# Patient Record
Sex: FEMALE | Race: WHITE | HISPANIC OR LATINO | Employment: UNEMPLOYED | URBAN - METROPOLITAN AREA
[De-identification: names, ages, dates, MRNs, and addresses within clinical notes are randomized per-mention and may not be internally consistent; named-entity substitution may affect disease eponyms.]

---

## 2023-06-09 ENCOUNTER — APPOINTMENT (OUTPATIENT)
Dept: RADIOLOGY | Facility: MEDICAL CENTER | Age: 38
End: 2023-06-09
Attending: EMERGENCY MEDICINE
Payer: MEDICAID

## 2023-06-09 ENCOUNTER — HOSPITAL ENCOUNTER (EMERGENCY)
Facility: MEDICAL CENTER | Age: 38
End: 2023-06-10
Attending: EMERGENCY MEDICINE
Payer: MEDICAID

## 2023-06-09 DIAGNOSIS — S60.222A CONTUSION OF LEFT HAND, INITIAL ENCOUNTER: ICD-10-CM

## 2023-06-09 DIAGNOSIS — R20.0 NUMBNESS AND TINGLING IN LEFT HAND: ICD-10-CM

## 2023-06-09 DIAGNOSIS — R20.2 NUMBNESS AND TINGLING IN LEFT HAND: ICD-10-CM

## 2023-06-09 DIAGNOSIS — M79.642 LEFT HAND PAIN: ICD-10-CM

## 2023-06-09 PROCEDURE — 73130 X-RAY EXAM OF HAND: CPT | Mod: RT

## 2023-06-09 PROCEDURE — 99283 EMERGENCY DEPT VISIT LOW MDM: CPT

## 2023-06-10 VITALS
DIASTOLIC BLOOD PRESSURE: 72 MMHG | OXYGEN SATURATION: 98 % | HEIGHT: 67 IN | SYSTOLIC BLOOD PRESSURE: 128 MMHG | HEART RATE: 90 BPM | RESPIRATION RATE: 16 BRPM | WEIGHT: 233.69 LBS | BODY MASS INDEX: 36.68 KG/M2 | TEMPERATURE: 98 F

## 2023-06-10 NOTE — CONSULTS
"Orthopedic Consult Note    6/9/2023    Time Called: 9:59 PM  Time Arrived: 10:25 PM  Reason for consult: Rule out compartment syndrome right hand      HPI: Monse Sparks is a 38 y.o. female who presents with complaints of pain, tingling and color changes to the right hand fingers.  She states 3 days ago she slammed her hand in the fridge.  Since then she has been experiencing swelling, discoloration of her fingers turning pale to blue to back to normal.  She has tingling as well to her digits that tracks up her arms up to her elbows.    She has a history of more than a pack a day smoking. she denies any history of this happening to her hands before.  History reviewed. No pertinent past medical history.    History reviewed. No pertinent surgical history.    Medications  No current facility-administered medications on file prior to encounter.     No current outpatient medications on file prior to encounter.       Allergies  Patient has no known allergies.    ROS  12 point review of systems reviewed the patient negative listed in the HPI. All other systems were reviewed and found to be negative    History reviewed. No pertinent family history.    Social History     Socioeconomic History    Marital status: Single   Tobacco Use    Smoking status: Every Day     Packs/day: 0.50     Types: Cigarettes    Smokeless tobacco: Never   Substance and Sexual Activity    Alcohol use: Not Currently     Comment: 3-4 months sober    Drug use: Not Currently       Physical Exam  Vitals  BP (!) 140/87   Pulse 95   Temp 36.4 °C (97.6 °F) (Temporal)   Resp 18   Ht 1.702 m (5' 7\")   Wt 106 kg (233 lb 11 oz)   SpO2 99%   General: Well Developed, Well Nourished, Age appropriate appearance  HEENT: Normocephalic, atraumatic  Psych: Normal mood and affect  Neck: Supple, nontender, no masses  Lungs: Breathing unlabored, No audible wheezing  Heart: Regular heart rate and rhythm  Abdomen: Soft, NT, ND  Neuro: Sensation grossly intact to BUE " and BLE, moving all four extremities  Vascular: She has delayed capillary refill to the digits of the right hand all digits are involved.  There is blood flow however.  The fingers intermittently become slightly bluish in coloration and then regained color.    MSK: Right upper extremity: Compartments are soft and compressible and not swollen to the right hand.  There is no concern for compartment syndrome to the right hand.  There is no deformity of the right hand.  There is minimal soft tissue swelling diffusely to the right hand.  Sensation is present to light touch to all fingers.  Sensation is present to light touch in median radian and ulnar nerve distributions.  Subjective diminished sensation to the right hand compared to the left.  She has 2+ radial pulse.  Capillary refill is greater than 3 seconds but is present to all fingers.      Radiographs:  DX-HAND 3+ RIGHT   Final Result         No acute osseous abnormality.          Laboratory Values      No results for input(s): SODIUM, POTASSIUM, CHLORIDE, CO2, GLUCOSE, BUN, CPKTOTAL in the last 72 hours.          Impression: 38-year-old female 3 days status post getting her right hand slammed into a refrigerator door    Consulted to rule out compartment syndrome.    She has no fractures.  There is no concern for compartment syndrome.  Her exam is concerning for a vasculitis affecting all digits of her right hand possibly triggered by trauma to the right hand.        Plan:  No acute orthopedic intervention indicated  No concern for compartment syndrome.  No fractures to the hand  Exam is most concerning for a vasculitis affecting the vessels in her right hand.  I recommend a vascular consult  Recommend smoking cessation    This patient was seen at the request of the emergency room provider for an urgent consult to rule out compartment syndrome and thus could not be done on an outpatient basis.      Isai Haney MD  Orthopedic Trauma Fellow

## 2023-06-10 NOTE — ED TRIAGE NOTES
Chief Complaint   Patient presents with    Hand Injury     R hand. Pt got her hand pinned behind a freezer while moving 3 days ago and pt reports numbness in her fingers, pain in hand and her fingers are turning purple.     Sent by MD     Pt sent from [facility] for concern of compartment syndrome.      Pt ambulatory to triage for above complaint. Pt reports intermittent discoloration to her fingertips where they turn purple and then get some color back. Pt has pain that radiates all the way up her arm and into her chest area at times. Pt can still move fingers, but denies sensation when touched.

## 2023-09-18 ENCOUNTER — DOCUMENTATION (OUTPATIENT)
Dept: VASCULAR LAB | Facility: MEDICAL CENTER | Age: 38
End: 2023-09-18
Payer: MEDICAID

## 2023-09-18 NOTE — PROGRESS NOTES
Left message for patient to call back and schedule new patient appt with Dr. Amador. Next available ok. Direct clinic line given on .       Marlen Fierro, Medical Assistant   [facility]   Ph: [phone]  Fx: [phone]

## 2023-10-03 ENCOUNTER — DOCUMENTATION (OUTPATIENT)
Dept: VASCULAR LAB | Facility: MEDICAL CENTER | Age: 38
End: 2023-10-03
Payer: MEDICAID

## 2023-10-03 ENCOUNTER — TELEPHONE (OUTPATIENT)
Dept: VASCULAR LAB | Facility: MEDICAL CENTER | Age: 38
End: 2023-10-03
Payer: MEDICAID

## 2023-10-03 NOTE — TELEPHONE ENCOUNTER
Failed contact with patient after few attempts for initial with Dr. Amador    Dates provided below / staff message sent to Dr. Bloch :    10/03-  VM  09/25- pt to call back / out of town / will f/u in a week   09/18-vm

## 2023-10-04 NOTE — PROGRESS NOTES
Patient referred to vascular care  Unfortunately our schedulers have been unable to reach patient despite multiple attempts  Unless patient establishes with a face-to-face visit in our office will be unable to take part in care  Await further patient contact.  Pending further contact, we will defer all further management of vascular disease and its risk factors to PCP and/or referring MD.    Michael J. Bloch, MD  Vascular Care

## 2023-12-18 ENCOUNTER — OFFICE VISIT (OUTPATIENT)
Dept: NEUROLOGY | Facility: MEDICAL CENTER | Age: 38
End: 2023-12-18
Attending: PSYCHIATRY & NEUROLOGY
Payer: MEDICAID

## 2023-12-18 VITALS
HEIGHT: 67 IN | OXYGEN SATURATION: 97 % | DIASTOLIC BLOOD PRESSURE: 62 MMHG | TEMPERATURE: 96.6 F | HEART RATE: 78 BPM | WEIGHT: 275.8 LBS | BODY MASS INDEX: 43.29 KG/M2 | SYSTOLIC BLOOD PRESSURE: 112 MMHG

## 2023-12-18 DIAGNOSIS — E66.01 SEVERE OBESITY (BMI >= 40) (HCC): ICD-10-CM

## 2023-12-18 DIAGNOSIS — F10.21 ALCOHOL DEPENDENCE IN REMISSION (HCC): ICD-10-CM

## 2023-12-18 DIAGNOSIS — D68.51 FACTOR 5 LEIDEN MUTATION, HETEROZYGOUS (HCC): ICD-10-CM

## 2023-12-18 DIAGNOSIS — R20.0 NUMBNESS OF FINGERS: ICD-10-CM

## 2023-12-18 DIAGNOSIS — F17.201 TOBACCO DEPENDENCE IN REMISSION: ICD-10-CM

## 2023-12-18 PROBLEM — D64.9 ACUTE ANEMIA: Status: ACTIVE | Noted: 2023-06-15

## 2023-12-18 PROCEDURE — 3074F SYST BP LT 130 MM HG: CPT | Performed by: PSYCHIATRY & NEUROLOGY

## 2023-12-18 PROCEDURE — 99211 OFF/OP EST MAY X REQ PHY/QHP: CPT | Performed by: PSYCHIATRY & NEUROLOGY

## 2023-12-18 PROCEDURE — 99204 OFFICE O/P NEW MOD 45 MIN: CPT | Performed by: PSYCHIATRY & NEUROLOGY

## 2023-12-18 PROCEDURE — 3078F DIAST BP <80 MM HG: CPT | Performed by: PSYCHIATRY & NEUROLOGY

## 2023-12-18 RX ORDER — OMEPRAZOLE 40 MG/1
40 CAPSULE, DELAYED RELEASE ORAL DAILY
COMMUNITY
Start: 2023-10-05

## 2023-12-18 RX ORDER — HYDROXYZINE PAMOATE 50 MG/1
50 CAPSULE ORAL 3 TIMES DAILY PRN
COMMUNITY
End: 2024-01-15

## 2023-12-18 RX ORDER — SILDENAFIL 25 MG/1
25 TABLET, FILM COATED ORAL DAILY
COMMUNITY
End: 2024-01-15

## 2023-12-18 ASSESSMENT — PATIENT HEALTH QUESTIONNAIRE - PHQ9: CLINICAL INTERPRETATION OF PHQ2 SCORE: 0

## 2023-12-18 NOTE — PROGRESS NOTES
Reason for Consult:  sensory disturbances of digits of right hand after trauma of right distal fingers    History of present illness:    Monse Sparks 38 y.o.right handed woman who is here with her father. [place] and graduated High School there. No additional education since that time.    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    Problem List reviewed.  Factor 5 Leiden positivity tested after her above accident>   SINGLE R506Q mutation identified (HETEROZYGOTE).    She had an accident> she was helping someone move a refrigerator at her friends house and smashed her right hand (specifically in the area of the right index,middle,ring and 5th fingers)> about 2 days after this above event  happened the digits (as above) all turned black within a period of 24 hours   > she showed me a picture of the fingers.    She started to take nitroglycerin ointment (placed on her fingers) and then started to take Viagra (25 mg a day).     The pictures she showed me had eschar type to the point that the skin peeled off  of the distal portions of the areas of the 5th,4th and 2nd (index) fingers from the approximate DIP joints to the tips of fingers and the nails were yellow (note: the middle finger (ie, 3rd finger)> was very limited in terms of any color change or sensory disturbances (this finger feels normal now).    She patricia a picture on the palmar portions of the right fingers where there is reduced sensation distally to the boundaries of her pen drawings.    The areas of loss of sensation remains in the right distal 5th finger between the DIP and PIP joints (only on the palm side), just below the crease of the right 4th finger's PIP joint and just proximal to the DIP joint of the right index finger.    The above locations and degree of sensory loss has remained to same to Mark over the last 3 plus months without any noticeable changes in the distribution or characteristics  (severity) of the sensory abnormalities.    Note: Entire right thumb and entire palmar surface and entire right dorsal parts of the hand and fingers and thumb feel normal to her.    She has not been limited by or doing texting,eating,holding basic objects of over 10 lbs in her right hand in the recent few months.    She has not been consistently dropping objects (light up to 10 lbs or so) from her right hand in the last several months.    She has not noticed any neuropathic pains (sharp,aching,stabbing,burning discomforts) in or involving the right hand and fingers proper in the last few months.    She has no history of prior sensory disturbances of the digits or distal limbs proper in the months to years preceding August 2023.    Of note: she had Ultrasound assessment of her right wrist crease area where they discovered a blood clot around the wrist. Because of her history of bleeding ulcer(s) she was not given any anticoagulants.    There is no Neuropathic pain(s) ongoing of the right hand or fingers proper in the recent months.      Quit smoking about 6 months ago>>  1.5 to 2 packs day since High School. She quit at the time of her injury.  Drinking alcohol - 1 pint daily (Vodka) and occasionally Whiskey since High School years and until about 6 months.  No IVDA or other illicit drugs use.      Family Hx:    Factor 5 Leiden > Mother, Mark and Daughter.      Siblings: alive and well      There are no problems to display for this patient.      Past medical history:   No past medical history on file.    Past surgical history:   No past surgical history on file.      Social history:   Social History     Socioeconomic History    Marital status: Single     Spouse name: Not on file    Number of children: Not on file    Years of education: Not on file    Highest education level: Not on file   Occupational History    Not on file   Tobacco Use    Smoking status: Former     Current packs/day: 0.50     Types: Cigarettes     "Smokeless tobacco: Never   Vaping Use    Vaping Use: Never used   Substance and Sexual Activity    Alcohol use: Not Currently     Comment: 3-4 months sober    Drug use: Not Currently    Sexual activity: Not on file   Other Topics Concern    Not on file   Social History Narrative    Not on file     Social Determinants of Health     Financial Resource Strain: Not on file   Food Insecurity: Not on file   Transportation Needs: Not on file   Physical Activity: Not on file   Stress: Not on file   Social Connections: Not on file   Intimate Partner Violence: Not on file   Housing Stability: Not on file       Family history:   No family history on file.      Current medications:   Current Outpatient Medications   Medication    omeprazole (PRILOSEC) 40 MG delayed-release capsule    sildenafil citrate (VIAGRA) 25 MG Tab     No current facility-administered medications for this visit.       Medication Allergy:  No Known Allergies        Physical examination:   Vitals:    12/18/23 0829   BP: 112/62   BP Location: Right arm   Patient Position: Sitting   BP Cuff Size: Large adult   Pulse: 78   Temp: 35.9 °C (96.6 °F)   TempSrc: Temporal   SpO2: 97%   Weight: (!) 125 kg (275 lb 12.7 oz)   Height: 1.702 m (5' 7\")       Normal Cephalic Atraumatic.  General: Full Range of Movement around the Neck in all directions without restrictions or discrete pain evoked triggers.  No lower extremity edema.      Neurological  Exam:    Mental status: Awake, alert and fully oriented to person, place, time, and situation. Normal attention and concentration.  Did not appear/act combative,irritable,anxious,paranoid/delusional or aggressive to or with me.  Speech and language: Speech is fluent without errors, clear, intact to repetition, and intact to naming.     Follows 3 step motor commands in sequence without significant delay and correctly.    Cranial nerve exam:  II: Pupils are equally round and reactive to light. Visual fields are intact by " confrontation.  III, IV, VI: EOMI, no diplopia, no ptosis.  V: Sensation to light touch is normal over V1-3 distributions bilaterally.  .  VII: Facial movements are symmetrical. There is no facial droop. .  VIII: Hearing intact to soft speech and finger rub bilaterally  IX: Palate elevates symmetrically, uvula is midline. Dysarthria is not present.  XI: Shoulder shrug are symmetrical and strong.   XII: Tongue protrudes midline.        Motor exam:  Muscle tone is normal in all 4 limbs. and No abnormal movements appreciated.    There is no atrophy of the right hand muscles seen.    There is no atrophy of the right intrinsic hands muscles.      Muscle strength:    Neck Flexors/Extensors: 5/5       Right  Left  Deltoid   5/5  5/5      Biceps   5/5  5/5  Triceps              5/5  5/5   Wrist extensors 5/5  5/5  Wrist flexors  5/5  5/5     5/5  5/5  Interossei  5/5  5/5  Thenar (APB)  5/5  5/5   Hip flexors  5/5  5/5  Quadriceps  5/5  5/5    Hamstrings  5/5  5/5  Dorsiflexors  5/5  5/5  Plantarflexors  5/5  5/5  Toe extension  5/5  5/5  Toe Flexors                5/5                   5/5    Sensory exam:    There is a negative Tinel's response at the right ulnar wrist crease.    Vibratory Threshold:    Right 5th finger:   7-8  seconds  at distal joint  Right 4th finger:    7-9  seconds at distal joint  Right 3rd finger:   14-16 seconds at distal joint  Right 2nd finger:   20 seconds at distal joint  Right Thumb:   18-20 seconds at distal joint    Proprioception: normal at all DIP Joints Right Thumb through 5th fingers.    NO allodynia,hyperpathia, hypalgia  of any of the right hand's fingers or thumb.      Left hand: Thumb and 5th fingers>  18-20 seconds at DIP joints.  Proprioception: normal at all DIP joints.    Reduced but present pin prick- 4th,5th and part of very distal tip of right index fingers.      Reflexes:       Right  Left  Biceps   2/4  2/4  Triceps              2/4  2/4  Brachioradialis 2/4  2/4  Knee  jerk  2/4  2/4  Ankle jerk  2/4  2/4     Frontal release signs are normal.    bilaterally toes are downgoing to plantar stimulation..    Coordination (finger-to-nose, heel/knee/shin, rapid alternating movements) was normal.     There was no truncal ataxia, no tremors, and no dysmetria.     Station and gait - easily stands up from exam chair without retropulsion,veering,leaning,swaying (to either side).   Arm swing symmetrical.    No Rombergism.      Labs and Tests:    June 14 2023:    PT,INR,PTT- wnl.  dRVVT- 34.7  Anticardiolipin ab <9 (wnl).  Anticardiolipin ab IGG,IGM- wnl    Beta 2 Glycoprotein ab - IGG- <9- wnl  Beta 2 Glycoprotein ab IGM- <9 - wnl    Antiphospholipid syndrome assessment> a lupus anticoagulant is not detected (6/14/2023)    Protein S  (Total,free,functional)- all normal    BRIDGER- DNA (DS)- 2  RNP antibodies- <0.2   Guzmán antibodies < 0.2  Antiscleroderma-j70 Antibodies< 0.2   Sjogrens anti SSA-SSB- < 0.2   Antichromatic antibodies  <0.2   Anit Shreya 1<  0.2  Anit Centrometere B Antibodies < 0.2      Protein C Antigen  87  Protein C Functional 115     NEUROIMAGING:     [facility] ([place], [place]) blood work reviewed from August 2023 (per referral information)      Impression/Plans/Recommendations:    Nerve Injury- compression (external) with focal blood clot in August 2023 > seen at [facility].    Residual mild and static painless numbness remains of the right 4th,5th and parts of the right index fingers (sparing middle finger and thumb).    There is no present evidence for Causalgia/RSD.      Gross strength of individual muscles of right hand including right ADM,Adductor pollicus, FDI, finger flexors (at DIP and PIP joints) are good and symmetrical compared to left hand.    There is no evidence for a widespread peripheral neuropathy condition at this time.    We had a discussion about the pros and cons of electrodiagnostic testing (such as Nerve Conduction with EMG  testing) and given the overall situation and mild degree of sensory impairments such testing would be to me more objective and not change future management nor predict future potential for nerve regeneration that is abt to occur in the next year.      Her hand deficits to me are leading to significant functional limitations at this time and thus a more complex nerve reinnvervation procedure would likely lead to more risks than the minor deficits she presently has at the moment and in the last few months.  She was in agreement to not pursue additional electrodiagnostic testing after our discussion today.      2. Obesity- encouraged goal BMI under 30.0 in the next year to reduce the associated metabolic complications of developing diabetes.    3. Factor 5 Leiden- Heterozygote- discovered at time of Right hand compression accident in August 2023.    4. Tobacco and Alcohol Dependency- in sustained remission since August 2023.    I have performed  a history and physical exam and a directed /focused  ROS today.    Total time spent today or this patient's care was 54  minutes included reviewing diagnostic workup to date (labs and imaging that include interpreting such tests relevant to this patient's neurological condition),  reviewing/obtaining separately obtained history (from patient and/or accompanying family father)  for today's neurological problem(s) , ordering either/or medications and additional tests, giving advise and suggestions on the present neurological problem and  documenting the clinical information in the EMR.    Follow up at this time KATHRINE Lyons MD  [place] [facility].   [facility]

## 2024-01-15 ENCOUNTER — TELEMEDICINE (OUTPATIENT)
Dept: VASCULAR LAB | Facility: MEDICAL CENTER | Age: 39
End: 2024-01-15
Attending: FAMILY MEDICINE
Payer: MEDICAID

## 2024-01-15 ENCOUNTER — APPOINTMENT (OUTPATIENT)
Dept: HEMATOLOGY ONCOLOGY | Facility: MEDICAL CENTER | Age: 39
End: 2024-01-15
Payer: MEDICAID

## 2024-01-15 DIAGNOSIS — D68.51 FACTOR 5 LEIDEN MUTATION, HETEROZYGOUS (HCC): ICD-10-CM

## 2024-01-15 DIAGNOSIS — E66.01 SEVERE OBESITY (BMI >= 40) (HCC): ICD-10-CM

## 2024-01-15 DIAGNOSIS — R20.0 NUMBNESS OF FINGERS: ICD-10-CM

## 2024-01-15 DIAGNOSIS — Z87.891 FORMER SMOKER: ICD-10-CM

## 2024-01-15 DIAGNOSIS — F10.21 ALCOHOL USE DISORDER, MODERATE, IN SUSTAINED REMISSION (HCC): ICD-10-CM

## 2024-01-15 DIAGNOSIS — Z87.11 HISTORY OF PEPTIC ULCER DISEASE: ICD-10-CM

## 2024-01-15 DIAGNOSIS — I73.00 SECONDARY RAYNAUD'S PHENOMENON: ICD-10-CM

## 2024-01-15 PROCEDURE — 99204 OFFICE O/P NEW MOD 45 MIN: CPT | Performed by: FAMILY MEDICINE

## 2024-01-15 RX ORDER — SILDENAFIL 25 MG/1
25 TABLET, FILM COATED ORAL
Qty: 60 TABLET | Refills: 5 | Status: SHIPPED | OUTPATIENT
Start: 2024-01-15

## 2024-01-15 RX ORDER — ERGOCALCIFEROL 1.25 MG/1
CAPSULE ORAL
COMMUNITY
Start: 2024-01-11

## 2024-01-15 RX ORDER — FLUOXETINE HCL 20 MG
CAPSULE ORAL
COMMUNITY
Start: 2024-01-11

## 2024-01-15 ASSESSMENT — ENCOUNTER SYMPTOMS
WHEEZING: 0
COUGH: 0
SHORTNESS OF BREATH: 0
FEVER: 0
PND: 0
PALPITATIONS: 0
SPUTUM PRODUCTION: 0
CHILLS: 0
ORTHOPNEA: 0
HEMOPTYSIS: 0
CLAUDICATION: 0

## 2024-01-15 NOTE — PROGRESS NOTES
This visit was conducted via Zoom video call using secure and encrypted video conferencing technology to reduce spread of and/or potential exposures to COVID.  The patient was in a private location (home) in the state of [place].    The patient's identity was confirmed and verbal consent was obtained for this virtual visit.     INITIAL VASCULAR ANTICOAGULATION VISIT  01/15/24     Monse Sparks is a 38 y.o. female who presents for initial  Initially referred by ROSITA Ku ([place], [place]) for review of FVL+ and VTE risk eval and raynaud's, est 1/2024    Subjective      Raynaud's phenomenon, secondary   INITIAL VISIT HISTORY: had RUE hand/wrist crush injury 6/2023, seen at [facility] based clinic and had RUE art duplex with ulnar art stenosis, had thrombophilia w/u, started on sildenafil.  Still having RUE index to little finger pallor with cyanosis with exercise, increase HR, cold temp but limited pain, no ulcerations/wounds on finger tips.  Last 30min and resolves with rewarming and reduced HR.    No other known vascular disease.   Notes from [facility] reviewed.   Hx of Tobacco? Yes, Details: quit   Any occupations/environmental factors? No  Any vasoactive drug exposure?  No  Hx of neuropathy? No  Hx of PAD or VTE/CVI? Yes, Details: RUE ulnar stenosis 6/2023   Hx of Endocrine disease? No  Hx of Rheum/auto-immune disease? No  Hx of cancer? No  Famhx of Raynaud's or rheum disorders (see famhx)? No  Abnormalities on prior labs? No except FVL+     Heterozygous FVL+  INITIAL VISIT HISTORY: missed initial appt 10/2023, had prior thrombophilia eval.  Had hand crush injury and darkening of hand.  No issues.    Reports with exercise noting progressive darkening of the index to little to PIP joints.  Recovers at 30min and improves with heart.  Had cold fingers and intermittent pain.    Hx of bleeding PUD - pending GI (last July 2023) - prior heavy etoh.    PROVOKING FACTORS:  Personal  VTE hx? 6/2023 - had crush injury, seen at [facility], [facility] and had RUE duplex showing  Family VTE hx or clotting d/o? Yes, Details: FVL+  Other risk factors:  obesity   WOMEN:        Any estrogen, testosterone HRT, E2 birth control, tamoxifene, raloxifene: no       Hx of recurrent miscarriages: no  Hypercoagulability work-up completed?  no    Patient Active Problem List   Diagnosis    Tobacco dependence in remission    Numbness of fingers    Factor 5 Leiden mutation, heterozygous (HCC)    Severe obesity (BMI >= 40) (HCC)    Alcohol dependence in remission (HCC)    Acute anemia    Secondary Raynaud's phenomenon    Former smoker    History of peptic ulcer disease      History reviewed. No pertinent surgical history.    Current Outpatient Medications:     PROzac,     vitamin D2 (Ergocalciferol),     sildenafil citrate, 25 mg, Oral, BID    omeprazole, 40 mg, Oral, DAILY     No Known Allergies    Family History   Problem Relation Age of Onset    Clotting Disorder Mother         FVL+    DVT Mother     Pulmonary Embolism Mother     Clotting Disorder Maternal Grandmother         FVL+    Clotting Disorder Daughter         FVL+     Social History     Tobacco Use    Smoking status: Former     Current packs/day: 0.50     Types: Cigarettes    Smokeless tobacco: Never   Vaping Use    Vaping Use: Never used   Substance Use Topics    Alcohol use: Not Currently     Comment: 3-4 months sober    Drug use: Not Currently     DIET AND EXERCISE:  Weight Change:stable   Wt Readings from Last 3 Encounters:   12/18/23 (!) 125 kg (275 lb 12.7 oz)   06/09/23 106 kg (233 lb 11 oz)    Diet: common adult  Exercise: minimal exercise     Review of Systems   Constitutional:  Negative for chills, fever and malaise/fatigue.   Respiratory:  Negative for cough, hemoptysis, sputum production, shortness of breath and wheezing.    Cardiovascular:  Negative for chest pain, palpitations, orthopnea, claudication, leg swelling and PND.       Objective    There were  "no vitals filed for this visit.   BP Readings from Last 5 Encounters:   12/18/23 112/62   06/10/23 128/72      There is no height or weight on file to calculate BMI.  Physical Exam  Constitutional:       Appearance: Normal appearance.   HENT:      Head: Normocephalic.   Eyes:      Extraocular Movements: Extraocular movements intact.      Conjunctiva/sclera: Conjunctivae normal.   Pulmonary:      Effort: Pulmonary effort is normal.   Musculoskeletal:      Cervical back: Normal range of motion.   Skin:     General: Skin is dry.      Coloration: Skin is not pale.      Findings: No rash.   Neurological:      General: No focal deficit present.      Mental Status: She is alert and oriented to person, place, and time.   Psychiatric:         Mood and Affect: Mood normal.         Behavior: Behavior normal.             No results found for: \"LIPOPROTA\"   No results found for: \"APOB\"                 Lab Results   Component Value Date    HEMOGLOBIN 7.1 (L) 06/16/2023 6/2023                     VASCULAR IMAGING:    E art duplex 6/2023 ([facility])  The right subclavian, axillary, brachial, and radial arteries are patent with strongly pulsatile Doppler waveforms with multiphasic morphology. Peak systolic velocity is between 89 and 137 cm/s without focal velocity elevation. The brachial artery bifurcates in the proximal upper arm. The proximal ulnar artery shows a pulsatile multiphasic Doppler waveform with peak systolic velocity 61 cm/s. The distal ulnar artery at the wrist shows a monophasic Doppler waveform with peak systolic velocity of 27 cm/s.   IMPRESSION:   1. Indirect evidence of a ulnar artery stenosis between the proximal forearm and the wrist.   2. The remainder of the right upper extremity arterial examination is normal.           Medical Decision Making:  Today's Assessment / Status / Plan:     1. Factor 5 Leiden mutation, heterozygous (HCC)        2. Secondary Raynaud's phenomenon  sildenafil citrate (VIAGRA) 25 " MG Tab      3. Alcohol use disorder, moderate, in sustained remission (HCC)        4. Severe obesity (BMI >= 40) (HCC)        5. Numbness of fingers        6. Former smoker        7. History of peptic ulcer disease          PATIENT TYPE: Primary Prevention    Etiology of Established CVD if Present:     1) Raynaud's phenomenon, secondary to prior crush injury - symptomatic, improving with sildenafil but still lifestyle limiting   6/2023 RUE art duplex shows ulnar artery stenosis, no WBI with PPE available   - no indications of primary rheum conditions based upon labs 6/2023   - FVL+ would not induced arterial clotting or disease, so as reviewed with pt this would not be contributory   Plan:  - limit caffeine, avoid vasoconstrictor OTC meds/drugs, no smoking  - reduce cold temp exposures   - gore ayse gloves, lopez wool socks,  Warming pads for feet prn   - consider cryoglob lab - likely low value as not systemic   - consider inflam markers, vasculitis w/u along with WBI/RUE art duplex at future visit   Meds:   - increase sildenafil to 25mg BID (dosing up to 50mg BID vs 20mg TID are standard practice for Raynaud's)  - reviewed use of amlodipine and/or ARB as add-on   - continue fluoxetine 20mg daily as limited evidence as add-on therapy so should continue   - antiplat could be beneficial but in light of prior PUD, would hold for now and revisit after PUD resolved per GI     2) heterozyous FVL+ without prior VTE disease   Strong fhx of mother with recurrent VTE, multiple 1st / 2nd degree FVL+   Thrombophilia/hypercoag evaluation:  all other testing negative   -Though FVL provides a 4-5 fold increased RR of 1st episode of VTE, it is generally in the context of a secondary provoking factor.    - relative risk of recurrent VTE off OAC = 1.1 to 1.8  -Though presence of hetero FVL+ is not an absolute indication of lifelong OAC, it does strengthen the case if any of the following apply: male, strong fhx of VTE, unprovoked  VTE event, extensive proximal DVT, life-threatening PE, VTE in atypical location (mesenteric, portal, or cerebral vv).  - provided weblinks to educ handouts at initial visit   Plan:   - see antithromb plan below   For hetero FVL+ with personal hx of VTE and not on OAC, the following recommendations apply:  - avoid estrogen-containing contraceptives   - reduce modifiable VTE risk factors (eg. Tobacco, obesity)  - recommended for post-op prophylactic AC for most surgeries   - consider role of AC during pregnancy and postpartum   - educated on s/s of VTE and when to seek prompt medical attention   - consider testing and risk assessment for 1st degree relatives     ANTITHROMBOTIC THERAPY:  Date of initiation: n/a  HAS-BLED bleeding risk calc ([email or web address]): 1 pt, 3.4%, low risk - hx hemorrhagic PUD  Factors to consider for indefinite OAC: Strong Fhx of VTE   Last CBC, BMP: reviewed above   Expected duration: reviewed standard of care and no specific indication for proph OAC unless high risk events (as noted above), patient preference for VTE protection and safe benefits > risks with use, or has spontanous VTE event   - recurrent VTE events would necessitate lifelong OAC   Antithrombotic therapy plan:  - reviewed use of ASA 81mg daily - limited evidence for reduction of VTE in FVL+, but could be helpful in context of raynaud's as noted above   - no OAC unless acute event or high risk scenarios     LIPID MANAGEMENT:   Qualifies for Statin Therapy Based on 2018 ACC/AHA Guidelines: no  The ASCVD Risk score (Robby DK, et al., 2019) failed to calculate.  Major ASCVD events: None  High-risk conditions: None   Risk-enhancers: N/A  Currently on Statin: No  Tx goals: LDL-C <100 (consider non-HDL-C <130, apoB <90)  At goal? N/A  Plan:   - reinforced ongoing TLC measures as noted   - defer lab surveillance to PCP   Meds: none at this time      BLOOD PRESSURE CONTROL   ACC/AHA (2017) goal <130/80  Home BP at goal: yes  Office BP at  goal:  yes   Plan:   - continue healthy diet, activity, weight mgmt   Monitoring:   - routine clinic-based BP measurements at least once annually   Medications: no meds indicated at this time      GLYCEMIC STATUS:  Normal    LIFESTYLE INTERVENTIONS:    SMOKING: Stages of Change: Maintenance (sustained change >6mo)    reports that she has quit smoking. Her smoking use included cigarettes. She has never used smokeless tobacco.   - continued complete avoidance of all tobacco products     PHYSICAL ACTIVITY: continue healthy activity to improve CV fitness.  In general, targeting >150min/week of moderate-level activity or as much as tolerated in light of functional status and co-morbidities     WEIGHT MANAGEMENT AND NUTRITION: Mediterranean style dietary approach  and Weight reduction approach - reduce caloric intake by 300kcal/day to achieve 1-2lb weight loss per week     - obesity - reduction of BW will help reduce risks for VTE over time     OTHER: none     Instructed to follow-up with PCP for remainder of adult medical needs: yes  We will partner with other providers in the management of established vascular disease and cardiometabolic risk factors.    Studies to Be Obtained: consider WBI/RUE art duplex in future    Labs to Be Obtained: as noted in assessment    Follow up in: 3 months    Total time: 50min - chart review/prep, review of other providers' records, imaging/lab review, face-to-face time for history/examination, ordering, prescribing,  review of results/meds/ treatment plan with patient/family/caregiver, documentation in EMR, care coordination (as needed)     Miguel Amador M.D.  Vascular Medicine Clinic   [facility]   [phone]

## 2024-04-16 ENCOUNTER — TELEPHONE (OUTPATIENT)
Dept: VASCULAR LAB | Facility: MEDICAL CENTER | Age: 39
End: 2024-04-16
Payer: MEDICAID

## 2024-04-16 ENCOUNTER — TELEMEDICINE (OUTPATIENT)
Dept: VASCULAR LAB | Facility: MEDICAL CENTER | Age: 39
End: 2024-04-16
Attending: FAMILY MEDICINE
Payer: MEDICAID

## 2024-04-16 DIAGNOSIS — F10.21 ALCOHOL USE DISORDER, MODERATE, IN SUSTAINED REMISSION (HCC): ICD-10-CM

## 2024-04-16 DIAGNOSIS — F17.201 TOBACCO USE DISORDER, SEVERE, IN SUSTAINED REMISSION: ICD-10-CM

## 2024-04-16 DIAGNOSIS — E66.01 SEVERE OBESITY (BMI >= 40) (HCC): ICD-10-CM

## 2024-04-16 DIAGNOSIS — I73.00 SECONDARY RAYNAUD'S PHENOMENON: ICD-10-CM

## 2024-04-16 DIAGNOSIS — D68.51 FACTOR 5 LEIDEN MUTATION, HETEROZYGOUS (HCC): ICD-10-CM

## 2024-04-16 DIAGNOSIS — R20.0 NUMBNESS OF FINGERS: ICD-10-CM

## 2024-04-16 PROCEDURE — 99999 PR NO CHARGE: CPT | Performed by: FAMILY MEDICINE

## 2024-04-16 ASSESSMENT — ENCOUNTER SYMPTOMS
CHILLS: 0
ORTHOPNEA: 0
HEMOPTYSIS: 0
PND: 0
SHORTNESS OF BREATH: 0
CLAUDICATION: 0
WHEEZING: 0
PALPITATIONS: 0
SPUTUM PRODUCTION: 0
COUGH: 0
FEVER: 0

## 2024-04-16 NOTE — TELEPHONE ENCOUNTER
Monse Sparks  No showed to follow-up with the vascular medicine clinic - virtual visit  Not connected on Epic, attempted to call and left v/m to c/b to reschedule to later date.     Scheduling staff will contact to reschedule WITH ANY AVAILABLE PROVIDER and remind patient of the importance of maintaining appointments as scheduled or to contact our office at least 24 hours in advance if they need to reschedule.       Patient should be aware of the potential for worsening conditions without appropriate follow-up and monitoring.      Vascular Medicine Clinic   [facility]   [phone]

## 2024-04-16 NOTE — PROGRESS NOTES
This visit was conducted via Zoom video call using secure and encrypted video conferencing technology to reduce spread of and/or potential exposures to COVID.  The patient was in a private location (home) in the state of [place].    The patient's identity was confirmed and verbal consent was obtained for this virtual visit.     FOLLOW-UP VASCULAR ANTICOAGULATION VISIT  04/16/24     Monse Sparks is a female who presents for f/u  Initially referred by ROSITA Ku ([place], [place]) for review of FVL+ and VTE risk eval and raynaud's, est 1/2024    Subjective      Raynaud's phenomenon, secondary   INITIAL VISIT HISTORY: had RUE hand/wrist crush injury 6/2023, seen at [facility] based clinic and had RUE art duplex with ulnar art stenosis, had thrombophilia w/u, started on sildenafil.  Still having RUE index to little finger pallor with cyanosis with exercise, increase HR, cold temp but limited pain, no ulcerations/wounds on finger tips.  Last 30min and resolves with rewarming and reduced HR.    No other known vascular disease.   Notes from [facility] reviewed.   Hx of Tobacco? Yes, Details: quit   Any occupations/environmental factors? No  Any vasoactive drug exposure?  No  Hx of neuropathy? No  Hx of PAD or VTE/CVI? Yes, Details: RUE ulnar stenosis 6/2023   Hx of Endocrine disease? No  Hx of Rheum/auto-immune disease? No  Hx of cancer? No  Famhx of Raynaud's or rheum disorders (see famhx)? No  Abnormalities on prior labs? No except FVL+     Heterozygous FVL+  INITIAL VISIT HISTORY: missed initial appt 10/2023, had prior thrombophilia eval.  Had hand crush injury and darkening of hand.  No issues.    Reports with exercise noting progressive darkening of the index to little to PIP joints.  Recovers at 30min and improves with heart.  Had cold fingers and intermittent pain.    Hx of bleeding PUD - pending GI (last July 2023) - prior heavy etoh.    PROVOKING FACTORS:  Personal VTE hx?  6/2023 - had crush injury, seen at [facility], [facility] and had RUE duplex showing  Family VTE hx or clotting d/o? Yes, Details: FVL+  Other risk factors:  obesity   Hypercoagulability work-up completed?  Yes    Current Outpatient Medications on File Prior to Visit   Medication Sig Dispense Refill    PROZAC 20 MG Cap       vitamin D2, Ergocalciferol, (DRISDOL) 1.25 MG (09569 [facility]) Cap capsule       sildenafil citrate (VIAGRA) 25 MG Tab Take 1 Tablet by mouth 2 times a day. For Raynaud's disease 60 Tablet 5    omeprazole (PRILOSEC) 40 MG delayed-release capsule Take 40 mg by mouth every day.       No current facility-administered medications on file prior to visit.       No Known Allergies    Family History   Problem Relation Age of Onset    Clotting Disorder Mother         FVL+    DVT Mother     Pulmonary Embolism Mother     Clotting Disorder Maternal Grandmother         FVL+    Clotting Disorder Daughter         FVL+     Social History     Tobacco Use    Smoking status: Former     Current packs/day: 0.50     Types: Cigarettes    Smokeless tobacco: Never   Vaping Use    Vaping Use: Never used   Substance Use Topics    Alcohol use: Not Currently     Comment: 3-4 months sober    Drug use: Not Currently     DIET AND EXERCISE:  Weight Change:stable   Wt Readings from Last 3 Encounters:   12/18/23 (!) 125 kg (275 lb 12.7 oz)   06/09/23 106 kg (233 lb 11 oz)    Diet: common adult  Exercise: minimal exercise     Review of Systems   Constitutional:  Negative for chills, fever and malaise/fatigue.   Respiratory:  Negative for cough, hemoptysis, sputum production, shortness of breath and wheezing.    Cardiovascular:  Negative for chest pain, palpitations, orthopnea, claudication, leg swelling and PND.       Objective    There were no vitals filed for this visit.   BP Readings from Last 5 Encounters:   12/18/23 112/62   06/10/23 128/72      There is no height or weight on file to calculate BMI.  Physical Exam  Constitutional:        "Appearance: Normal appearance.   HENT:      Head: Normocephalic.   Eyes:      Extraocular Movements: Extraocular movements intact.      Conjunctiva/sclera: Conjunctivae normal.   Pulmonary:      Effort: Pulmonary effort is normal.   Musculoskeletal:      Cervical back: Normal range of motion.   Skin:     General: Skin is dry.      Coloration: Skin is not pale.      Findings: No rash.   Neurological:      General: No focal deficit present.      Mental Status: She is alert and oriented to person, place, and time.   Psychiatric:         Mood and Affect: Mood normal.         Behavior: Behavior normal.             No results found for: \"LIPOPROTA\"   No results found for: \"APOB\"                 Lab Results   Component Value Date    HEMOGLOBIN 7.1 (L) 06/16/2023 6/2023                     VASCULAR IMAGING:    [facility] art duplex 6/2023 ([facility])  The right subclavian, axillary, brachial, and radial arteries are patent with strongly pulsatile Doppler waveforms with multiphasic morphology. Peak systolic velocity is between 89 and 137 cm/s without focal velocity elevation. The brachial artery bifurcates in the proximal upper arm. The proximal ulnar artery shows a pulsatile multiphasic Doppler waveform with peak systolic velocity 61 cm/s. The distal ulnar artery at the wrist shows a monophasic Doppler waveform with peak systolic velocity of 27 cm/s.   IMPRESSION:   1. Indirect evidence of a ulnar artery stenosis between the proximal forearm and the wrist.   2. The remainder of the right upper extremity arterial examination is normal.           Medical Decision Making:  Today's Assessment / Status / Plan:     1. Factor 5 Leiden mutation, heterozygous ([facility])        2. Secondary Raynaud's phenomenon        3. Severe obesity (BMI >= 40) ([facility])        4. Alcohol use disorder, moderate, in sustained remission ([facility])        5. Numbness of fingers        6. Tobacco use disorder, severe, in sustained remission            PATIENT TYPE: " Primary Prevention    Etiology of Established CVD if Present:     1) Raynaud's phenomenon, secondary to prior crush injury - symptomatic, improving with sildenafil but still lifestyle limiting   6/2023 RUE art duplex shows ulnar artery stenosis, no WBI with PPE available   - no indications of primary rheum conditions based upon labs 6/2023   - FVL+ would not induced arterial clotting or disease, so as reviewed with pt this would not be contributory   Plan:  - limit caffeine, avoid vasoconstrictor OTC meds/drugs, no smoking  - reduce cold temp exposures   - gore yase gloves, lopez wool socks,  Warming pads for feet prn   - consider cryoglob lab - likely low value as not systemic   - consider inflam markers, vasculitis w/u along with WBI/RUE art duplex at future visit   Meds:   - *** sildenafil to 25mg BID (dosing up to 50mg BID vs 20mg TID are standard practice for Raynaud's)  - reviewed use of amlodipine and/or ARB as add-on   - continue fluoxetine 20mg daily as has limited evidence as add-on therapy   - antiplat could be beneficial but in light of prior PUD, would hold for now and revisit after PUD resolved per GI     2) heterozyous FVL+ without prior VTE disease   Strong fhx of mother with recurrent VTE, multiple 1st / 2nd degree FVL+   Thrombophilia/hypercoag evaluation:  all other testing negative   -Though FVL provides a 4-5 fold increased RR of 1st episode of VTE, it is generally in the context of a secondary provoking factor.    - relative risk of recurrent VTE off OAC = 1.1 to 1.8  -Though presence of hetero FVL+ is not an absolute indication of lifelong OAC, it does strengthen the case if any of the following apply: male, strong fhx of VTE, unprovoked VTE event, extensive proximal DVT, life-threatening PE, VTE in atypical location (mesenteric, portal, or cerebral vv).  - provided weblinks to educ handouts at initial visit   Plan:   - see antithromb plan below   For hetero FVL+ with personal hx of VTE and  not on OAC, the following recommendations apply:  - avoid estrogen-containing contraceptives   - reduce modifiable VTE risk factors (eg. Tobacco, obesity)  - recommended for post-op prophylactic AC for most surgeries   - consider role of AC during pregnancy and postpartum   - educated on s/s of VTE and when to seek prompt medical attention   - consider testing and risk assessment for 1st degree relatives     ANTITHROMBOTIC THERAPY:  Date of initiation: n/a  HAS-BLED bleeding risk calc ([email or web address]): 1 pt, 3.4%, low risk - hx hemorrhagic PUD  Factors to consider for indefinite OAC: Strong Fhx of VTE   Last CBC, BMP: reviewed above   Expected duration: reviewed standard of care and no specific indication for proph OAC unless high risk events (as noted above), patient preference for VTE protection and safe benefits > risks with use, or has spontanous VTE event   - recurrent VTE events would necessitate lifelong OAC   Plan:  - reviewed use of ASA 81mg daily - limited evidence for reduction of VTE in FVL+, but could be helpful in context of raynaud's as noted above   - no OAC unless acute event or high risk scenarios     LIPID MANAGEMENT:   Qualifies for Statin Therapy Based on 2018 ACC/AHA Guidelines: no  The ASCVD Risk score (Robby DK, et al., 2019) failed to calculate.  Major ASCVD events: None    High-risk conditions: None   Risk-enhancers: N/A  Currently on Statin: No  Tx goals: LDL-C <100 (consider non-HDL-C <130, apoB <90)  At goal? N/A  Plan:   - reinforced ongoing TLC measures as noted   - defer lab surveillance to PCP   Meds: none at this time      BLOOD PRESSURE CONTROL   ACC/AHA (2017) goal <130/80  Home BP at goal: yes  Office BP at goal:  yes   Plan:   - continue healthy diet, activity, weight mgmt   Monitoring:   - routine clinic-based BP measurements at least once annually   Medications: no meds indicated at this time      GLYCEMIC STATUS:  Normal    LIFESTYLE INTERVENTIONS:  SMOKING: Stages of Change:  Maintenance (sustained change >6mo)    reports that she has quit smoking. Her smoking use included cigarettes. She has never used smokeless tobacco.   - continued complete avoidance of all tobacco products   PHYSICAL ACTIVITY:  >150min/week of moderate-level activity or as much as tolerated   WT MGMT/NUTRITION: Mediterranean style     - obesity - reduction of BW will help reduce risks for VTE over time     OTHER: none     Studies to Be Obtained: consider WBI/RUE art duplex in future    Labs to Be Obtained: as noted in assessment    Follow up in: ***    Miguel Amador M.D.  Vascular Medicine Clinic   [place] [facility]   [phone]

## 2024-06-26 ENCOUNTER — APPOINTMENT (OUTPATIENT)
Dept: ADMISSIONS | Facility: MEDICAL CENTER | Age: 39
End: 2024-06-26
Attending: INTERNAL MEDICINE
Payer: MEDICAID

## 2024-07-11 ENCOUNTER — PRE-ADMISSION TESTING (OUTPATIENT)
Dept: ADMISSIONS | Facility: MEDICAL CENTER | Age: 39
End: 2024-07-11
Attending: INTERNAL MEDICINE
Payer: MEDICAID

## 2024-07-11 DIAGNOSIS — Z01.812 PRE-OPERATIVE LABORATORY EXAMINATION: ICD-10-CM

## 2024-07-11 RX ORDER — BUPROPION HYDROCHLORIDE 150 MG/1
TABLET ORAL EVERY MORNING
COMMUNITY
Start: 2024-07-05

## 2024-07-11 RX ORDER — ACETAMINOPHEN 325 MG/1
650 TABLET ORAL EVERY 4 HOURS PRN
COMMUNITY

## 2024-07-11 RX ORDER — PROCHLORPERAZINE MALEATE 10 MG
TABLET ORAL PRN
COMMUNITY
Start: 2024-05-30

## 2024-07-22 ENCOUNTER — ANESTHESIA (OUTPATIENT)
Dept: SURGERY | Facility: MEDICAL CENTER | Age: 39
End: 2024-07-22
Payer: MEDICAID

## 2024-07-22 ENCOUNTER — ANESTHESIA EVENT (OUTPATIENT)
Dept: SURGERY | Facility: MEDICAL CENTER | Age: 39
End: 2024-07-22
Payer: MEDICAID

## 2024-07-22 ENCOUNTER — HOSPITAL ENCOUNTER (OUTPATIENT)
Facility: MEDICAL CENTER | Age: 39
End: 2024-07-22
Attending: INTERNAL MEDICINE | Admitting: INTERNAL MEDICINE
Payer: MEDICAID

## 2024-07-22 VITALS
OXYGEN SATURATION: 93 % | BODY MASS INDEX: 44.79 KG/M2 | WEIGHT: 285.39 LBS | HEART RATE: 62 BPM | HEIGHT: 67 IN | RESPIRATION RATE: 17 BRPM | TEMPERATURE: 97 F | SYSTOLIC BLOOD PRESSURE: 132 MMHG | DIASTOLIC BLOOD PRESSURE: 93 MMHG

## 2024-07-22 LAB
ERYTHROCYTE [DISTWIDTH] IN BLOOD BY AUTOMATED COUNT: 65.1 FL (ref 35.9–50)
HCG UR QL: NEGATIVE
HCT VFR BLD AUTO: 37.2 % (ref 37–47)
HGB BLD-MCNC: 11.5 G/DL (ref 12–16)
MCH RBC QN AUTO: 28.8 PG (ref 27–33)
MCHC RBC AUTO-ENTMCNC: 30.9 G/DL (ref 32.2–35.5)
MCV RBC AUTO: 93 FL (ref 81.4–97.8)
PATHOLOGY CONSULT NOTE: NORMAL
PLATELET # BLD AUTO: 260 K/UL (ref 164–446)
PMV BLD AUTO: 10.9 FL (ref 9–12.9)
RBC # BLD AUTO: 4 M/UL (ref 4.2–5.4)
WBC # BLD AUTO: 4.8 K/UL (ref 4.8–10.8)

## 2024-07-22 PROCEDURE — 160035 HCHG PACU - 1ST 60 MINS PHASE I: Performed by: INTERNAL MEDICINE

## 2024-07-22 PROCEDURE — 160046 HCHG PACU - 1ST 60 MINS PHASE II: Performed by: INTERNAL MEDICINE

## 2024-07-22 PROCEDURE — 88305 TISSUE EXAM BY PATHOLOGIST: CPT | Mod: 59

## 2024-07-22 PROCEDURE — 85027 COMPLETE CBC AUTOMATED: CPT

## 2024-07-22 PROCEDURE — 160009 HCHG ANES TIME/MIN: Performed by: INTERNAL MEDICINE

## 2024-07-22 PROCEDURE — 160025 RECOVERY II MINUTES (STATS): Performed by: INTERNAL MEDICINE

## 2024-07-22 PROCEDURE — 36415 COLL VENOUS BLD VENIPUNCTURE: CPT

## 2024-07-22 PROCEDURE — 160002 HCHG RECOVERY MINUTES (STAT): Performed by: INTERNAL MEDICINE

## 2024-07-22 PROCEDURE — 160048 HCHG OR STATISTICAL LEVEL 1-5: Performed by: INTERNAL MEDICINE

## 2024-07-22 PROCEDURE — 700105 HCHG RX REV CODE 258: Performed by: INTERNAL MEDICINE

## 2024-07-22 PROCEDURE — 700111 HCHG RX REV CODE 636 W/ 250 OVERRIDE (IP): Performed by: ANESTHESIOLOGY

## 2024-07-22 PROCEDURE — 160203 HCHG ENDO MINUTES - 1ST 30 MINS LEVEL 4: Performed by: INTERNAL MEDICINE

## 2024-07-22 PROCEDURE — 88312 SPECIAL STAINS GROUP 1: CPT

## 2024-07-22 PROCEDURE — 81025 URINE PREGNANCY TEST: CPT

## 2024-07-22 PROCEDURE — 160208 HCHG ENDO MINUTES - EA ADDL 1 MIN LEVEL 4: Performed by: INTERNAL MEDICINE

## 2024-07-22 RX ORDER — SODIUM CHLORIDE, SODIUM LACTATE, POTASSIUM CHLORIDE, CALCIUM CHLORIDE 600; 310; 30; 20 MG/100ML; MG/100ML; MG/100ML; MG/100ML
INJECTION, SOLUTION INTRAVENOUS CONTINUOUS
Status: DISCONTINUED | OUTPATIENT
Start: 2024-07-22 | End: 2024-07-22

## 2024-07-22 RX ORDER — HALOPERIDOL 5 MG/ML
1 INJECTION INTRAMUSCULAR
Status: DISCONTINUED | OUTPATIENT
Start: 2024-07-22 | End: 2024-07-22 | Stop reason: HOSPADM

## 2024-07-22 RX ORDER — SODIUM CHLORIDE, SODIUM LACTATE, POTASSIUM CHLORIDE, CALCIUM CHLORIDE 600; 310; 30; 20 MG/100ML; MG/100ML; MG/100ML; MG/100ML
INJECTION, SOLUTION INTRAVENOUS CONTINUOUS
Status: DISCONTINUED | OUTPATIENT
Start: 2024-07-22 | End: 2024-07-22 | Stop reason: HOSPADM

## 2024-07-22 RX ORDER — ONDANSETRON 2 MG/ML
4 INJECTION INTRAMUSCULAR; INTRAVENOUS
Status: DISCONTINUED | OUTPATIENT
Start: 2024-07-22 | End: 2024-07-22 | Stop reason: HOSPADM

## 2024-07-22 RX ORDER — DIPHENHYDRAMINE HYDROCHLORIDE 50 MG/ML
12.5 INJECTION INTRAMUSCULAR; INTRAVENOUS
Status: DISCONTINUED | OUTPATIENT
Start: 2024-07-22 | End: 2024-07-22 | Stop reason: HOSPADM

## 2024-07-22 RX ADMIN — PROPOFOL 1000 MG: 10 INJECTION, EMULSION INTRAVENOUS at 08:22

## 2024-07-22 RX ADMIN — SODIUM CHLORIDE, POTASSIUM CHLORIDE, SODIUM LACTATE AND CALCIUM CHLORIDE: 600; 310; 30; 20 INJECTION, SOLUTION INTRAVENOUS at 07:44

## (undated) DEVICE — SET EXTENSION WITH 2 PORTS (48EA/CA) ***PART #2C8610 IS A SUBSTITUTE*****

## (undated) DEVICE — SENSOR OXIMETER ADULT SPO2 RD SET (20EA/BX)

## (undated) DEVICE — SPONGE GAUZE NON-STERILE 4X4 - (2000/CA 10PK/CA)

## (undated) DEVICE — TUBE CONNECTING SUCTION - CLEAR PLASTIC STERILE 72 IN (50EA/CA)

## (undated) DEVICE — FORCEP RADIAL JAW 4 STANDARD CAPACITY W/NEEDLE 240CM (40EA/BX)

## (undated) DEVICE — CATHETER IV SAFETY 20 GA X 1-1/4 (50/BX)

## (undated) DEVICE — LACTATED RINGERS INJ 1000 ML - (14EA/CA 60CA/PF)

## (undated) DEVICE — SYRINGE SAFETY 10 ML 18 GA X 1 1/2 BLUNT LL (100/BX 4BX/CA)

## (undated) DEVICE — KIT CUSTOM PROCEDURE SINGLE FOR ENDO  (15/CA)

## (undated) DEVICE — GOWN SURGEONS LARGE - (32/CA)

## (undated) DEVICE — PAD PREP 24 X 48 CUFFED - (100/CA)

## (undated) DEVICE — CANISTER SUCTION RIGID RED 1500CC (40EA/CA)

## (undated) DEVICE — GOWN WARMING STANDARD FLEX - (30/CA)

## (undated) DEVICE — TUBING CLEARLINK DUO-VENT - C-FLO (48EA/CA)

## (undated) DEVICE — KIT  I.V. START (100EA/CA)

## (undated) DEVICE — SYRINGE SAFETY 5 ML 18 GA X 1-1/2 BLUNT LL (100/BX 4BX/CA)

## (undated) DEVICE — SYRINGE SAFETY 3 ML 18 GA X 1 1/2 BLUNT LL (100/BX 8BX/CA)